# Patient Record
Sex: MALE | Race: WHITE | URBAN - METROPOLITAN AREA
[De-identification: names, ages, dates, MRNs, and addresses within clinical notes are randomized per-mention and may not be internally consistent; named-entity substitution may affect disease eponyms.]

---

## 2022-09-30 ENCOUNTER — OFFICE VISIT (OUTPATIENT)
Dept: URBAN - METROPOLITAN AREA CLINIC 86 | Facility: CLINIC | Age: 62
End: 2022-09-30

## 2022-10-24 ENCOUNTER — LAB OUTSIDE AN ENCOUNTER (OUTPATIENT)
Dept: URBAN - METROPOLITAN AREA CLINIC 86 | Facility: CLINIC | Age: 62
End: 2022-10-24

## 2022-10-24 ENCOUNTER — CLAIMS CREATED FROM THE CLAIM WINDOW (OUTPATIENT)
Dept: URBAN - METROPOLITAN AREA CLINIC 86 | Facility: CLINIC | Age: 62
End: 2022-10-24
Payer: COMMERCIAL

## 2022-10-24 ENCOUNTER — WEB ENCOUNTER (OUTPATIENT)
Dept: URBAN - METROPOLITAN AREA CLINIC 86 | Facility: CLINIC | Age: 62
End: 2022-10-24

## 2022-10-24 VITALS
HEART RATE: 84 BPM | HEIGHT: 70 IN | WEIGHT: 254 LBS | TEMPERATURE: 97.3 F | DIASTOLIC BLOOD PRESSURE: 93 MMHG | BODY MASS INDEX: 36.36 KG/M2 | SYSTOLIC BLOOD PRESSURE: 166 MMHG

## 2022-10-24 DIAGNOSIS — E78.5 HYPERLIPIDEMIA: ICD-10-CM

## 2022-10-24 DIAGNOSIS — Z71.89 VACCINE COUNSELING: ICD-10-CM

## 2022-10-24 DIAGNOSIS — M10.9 GOUT: ICD-10-CM

## 2022-10-24 DIAGNOSIS — E61.1 IRON DEFICIENCY: ICD-10-CM

## 2022-10-24 DIAGNOSIS — Z79.899 HIGH RISK MEDICATION USE: ICD-10-CM

## 2022-10-24 DIAGNOSIS — R74.8 ABNORMAL LIVER ENZYMES: ICD-10-CM

## 2022-10-24 DIAGNOSIS — K75.4 AUTOIMMUNE HEPATITIS: ICD-10-CM

## 2022-10-24 DIAGNOSIS — I10 HTN (HYPERTENSION): ICD-10-CM

## 2022-10-24 DIAGNOSIS — D69.1 THROMBOCYTOPENIA: ICD-10-CM

## 2022-10-24 DIAGNOSIS — E29.1 ANDROGEN DEFICIENCY: ICD-10-CM

## 2022-10-24 DIAGNOSIS — E03.9 HYPOTHYROID: ICD-10-CM

## 2022-10-24 DIAGNOSIS — K51.90 ULCERATIVE COLITIS: ICD-10-CM

## 2022-10-24 DIAGNOSIS — E11.9 DIABETES: ICD-10-CM

## 2022-10-24 DIAGNOSIS — M12.9 ARTHROPATHY: ICD-10-CM

## 2022-10-24 DIAGNOSIS — L40.9 PSORIASIS: ICD-10-CM

## 2022-10-24 DIAGNOSIS — E66.9 OBESITY: ICD-10-CM

## 2022-10-24 DIAGNOSIS — Z86.010 HISTORY OF COLON POLYPS: ICD-10-CM

## 2022-10-24 PROCEDURE — 99245 OFF/OP CONSLTJ NEW/EST HI 55: CPT

## 2022-10-24 PROCEDURE — 99205 OFFICE O/P NEW HI 60 MIN: CPT

## 2022-10-24 RX ORDER — TESTOSTERONE CYPIONATE 200 MG/ML
1 ML INJECTION INTRAMUSCULAR
Status: ACTIVE | COMMUNITY

## 2022-10-24 RX ORDER — AZATHIOPRINE 50 1/1
TAKE 2 TABLET(100 MG) BY MOUTH EVERY DAY TABLET ORAL ONCE A DAY
Status: ACTIVE | COMMUNITY

## 2022-10-24 RX ORDER — LEVOTHYROXINE, LIOTHYRONINE 76; 18 UG/1; UG/1
1 TABLET ON AN EMPTY STOMACH TABLET ORAL ONCE A DAY
Status: ACTIVE | COMMUNITY

## 2022-10-24 RX ORDER — SEMAGLUTIDE 1.34 MG/ML
AS DIRECTED INJECTION, SOLUTION SUBCUTANEOUS
Status: ACTIVE | COMMUNITY

## 2022-10-24 RX ORDER — PREDNISOLONE 5 MG/1
12 TABLETS IN THE MORNING WITH FOOD OR MILK TABLET ORAL ONCE A DAY
Status: ACTIVE | COMMUNITY

## 2022-10-24 RX ORDER — METFORMIN HYDROCHLORIDE 500 MG/1
1 IN AM AND 2 PM TABLET, EXTENDED RELEASE ORAL AS DIRECTED
Status: ACTIVE | COMMUNITY

## 2022-10-24 RX ORDER — ALLOPURINOL 300 MG/1
1 TABLET TABLET ORAL ONCE A DAY
Status: ACTIVE | COMMUNITY

## 2022-10-24 RX ORDER — VARDENAFIL HYDROCHLORIDE 20 MG/1
1 TABLET 60 MINUTES BEFORE SEXUAL ACTIVITY AS NEEDED TABLET, FILM COATED ORAL ONCE A DAY
Status: ACTIVE | COMMUNITY

## 2022-10-24 RX ORDER — VEDOLIZUMAB 300 MG/5ML
AS DIRECTED INJECTION, POWDER, LYOPHILIZED, FOR SOLUTION INTRAVENOUS
Status: ACTIVE | COMMUNITY

## 2022-10-24 RX ORDER — FAMOTIDINE, CALCIUM CARBONATE, AND MAGNESIUM HYDROXIDE 10; 800; 165 MG/1; MG/1; MG/1
1 TABLET AS NEEDED TABLET, CHEWABLE ORAL TWICE A DAY
Status: ACTIVE | COMMUNITY

## 2022-10-24 RX ORDER — LISINOPRIL 20 MG/1
1 TABLET TABLET ORAL ONCE A DAY
Status: ACTIVE | COMMUNITY

## 2022-10-24 NOTE — EXAM-PHYSICAL EXAM
Gen: awake and responsive. Eyes: anicteric, normal lids. Mouth: covered with mask. Nose: covered with mask. Hearing: intact grossly. Neck: trachea midline and no jvd. CV: RRR no s3. Lungs: clear. No wheezes, Abd: Soft, nabs, nr, NT. No liver or spleen enlargement. Ext: no sig edema, some palm erythema. Neuro: moves all 4 ext grossly. No asterixis. Skin: no pruritis and some palm erythema.

## 2022-10-24 NOTE — HPI-TODAY'S VISIT:
Patient is a 62-year-old male being referred in by Dr. Joshua Garcia for history of chronic autoimmune hepatitis.  A copy of the note will be sent to the referring provider.  Approximately 51 pages of a planned 97 page fax were received and reviewed.  Dr Garcia:   October 18, 2022 note states that the patient is being followed for abnormal liver labs.  Dr. Garcia mentions that because of the increased activity of the suspected chronic autoimmune hepatitis that he had called the patient on September 20 2022 and advised him that he needed to proceed to initiate a treatment for it. Pt was out of town.  He mentioned his urine had become a deeper bean coloration during that week.  He denied pruritus.  he want to start him on prednisolone 60 mg a day but he had steroids on him and started prednisone and then switched back.  He was on vacation unfamiliar environment would not be able to  the medicine until October 1, 2022.    He did 3-4 steroids and then prednisolone when back.  Apparently the dose of prednisone he would take will be 50 mg and followed by 45 mg the next day and then 45 mg after that and then start the prednisolone 60mg when he was home.  Follow-up lab 13 days after you have been on the prednisolone demonstrated that the ALT had declined to 70 and AST had declined at 36.  Bilirubin declined at 1.7.  He felt less fatigued on the treatment.  Did have insomnia issues.  They had discussed apparently the benefit of adding other steroid sparing agents.  Clearly stated that he would add azathioprine 50 mg a day for follow-up his labs with the plan to increase to 100 mg a day.  On aza 100mg oct 23.  Stil prednisolone 60mg a day.  He was apparently taking for his ulcerative colitis and Entyvio 300 mg IV. x 3-4 yrs.  Pror remicade reaction.  Allergies are listed as none.  Medical problems include chronic autoimmune hepatitis, ulcerative colitis, GERD, serrated adenoma of the colon, history adenomatous polyp, iron deficiency in the past.  Hypertension, diabetes, androgen deficiency, hyperlipidemia, hypothyroidism, obesity, gout, arthropathy, thrombocytopenia at times, psoriasis.  Psoriasis is doing better with biologic and skin creams.  Social history was moderate alcohol usage about 3 beers per day and says not over day. He says mostly beer.  Stage 2 fibrosis.  Not a smoker. He is . He works in a [employer] manager.  Medicines list prednisolone 60 mg a day, and Entyvio 300 mg every 8 weeks beginning October 21, 2022.  Pepcid Complete he uses as needed, lisinopril 20 mg a day.  Allopurinol 300 mg a day Ozempic 1 mg dose, metformin extended release 500 mg 1 in a.m. and 2 in p.m.  NP thyroid 120 mg a day.  Testosterone cypionate 200 mg/mL as directed, Levitra 20 milligrams as needed, multivitamin daily, acetaminophen as needed. Aza 100mg now.  Family history of heart disease.  Past surgeries include appendectomy, colonoscopy in 2002, 2009, 2017, 2019, 2021, EGD 2009 2015.    Weight listed as being 33.91 BMI with a weight of 250 height is 72 inches with aDr Cherner and the same now.  Nov 2021 to now lost 60 pounds. On ozempic for while and was 318 and prior lot of steroids and then lost ore now. Jan 2021 labs started to go up.  end of 2020 to start 2021: wife started covid cocktail and vit d and vit c and Zinc. He started 1 yr or so ago and took J.FlashSoft shot.  Maybe started that then.  Covid 19 vitamins are immune stimulants and bigger issues with covid 19 echinacea and zinc and elderberry and these meds can trigger aih. He is still on them.  A biopsy from August 23, 2022 shows hepatic parenchyma to have mild to moderate portal inflammation and patchy lobular inflammation/chronic hepatitis.  Periportal fibrosis and focal portal portal fibrosis by trichrome stage II was seen no significant steatosis.  They felt that within the portal triad there was mild to focal moderate predominant chronic inflammatory infiltrate composed of lymphocytes, few plasma cells and few eosinophils.  Patchy lobular chronic inflammation identified but no significant piecemeal necrosis.  Bile ducts were seen in the portal tracts.  No significant fat was seen.  This was read at [facility].  October 20, 2022 labs showed glucose 250 BUN of 18 creatinine 0.98 sodium 133 potassium 4.5 albumin 3.7 bilirubin 1.8 direct bilirubin 0.66 alkaline phosphatase 120 AST 29 ALT 55 with an IgG of 1181.  White blood cell count was 10.6 hemoglobin 17.8 and platelet count was low at 119.  Neutrophils were 9.1 elevated and lymphocytes 1.1.  October 14 labs showed total bilirubin 1.7 alkaline phosphatase 127 AST 36 ALT 70 direct bilirubin 0.68.  White blood cell count 11.3 hemoglobin 17.6 plate count 140 MCV 94.  Neutrophils 9.8 lymphocytes 0.9. September 23 labs showed total protein 8.0 albumin 4.0 bilirubin 3.1 direct 2.06 alk phosphatase 213  .  IgG was 2539.  Glucose 182 BUN is 12 creatinine 0.98 sodium 137 potassium 4.4 White blood cell count 6 hemoglobin 17.2 platelet count 145 neutrophils 3.6 lymphocytes 1.3.  C-reactive protein was normal at 9.  July 29 labs showed white cell count 6.4 hemoglobin 17.4 plate count 153 albumin 4.3 bilirubin 1.  Bili 0.4 alkaline phosphatase 143   INR 1.2 smooth muscle antibody 79 which was positive. July 12, 2022 labs showed bilirubin 1.4 direct bilirubin 0.44 alkaline phosphatase 138   smooth muscle antibody 61 AMA negative at less than 20 and calcium negative at 1.1 IgG elevated 2068 alpha-1 154 normal ferritin 56 ELSI was negative.  June 24, 2022 labs show white blood cell count 5.8 hemoglobin 17.7 plate count 163 glucose 176 BUN of 10 creatinine 0.9 bilirubin 1.4.  Bilirubin 0.64 alkaline phosphatase 157   June 13, 2022 labs showed white blood cell count 5.7 hemoglobin 16.6 platelet count 120 C-reactive protein is 3.  March 14,022 bilirubin 1.3 direct bili 0.54 alkaline phosphatase 151  .  Femoral 25th 2022 labs showed bilirubin 2.1 direct low at 1.07 alk phos 200   hep C antibody negative B surface antigen negative hep A IgM negative B core IgM negative.  Feb 16 2022 labs showed bilirubin 2.1 direct bilirubin 1.33 alk phos 191  .  glucose 159 BUN of 10 creatinine 0.89 sodium 136 potassium 4.7.  White blood cell count 5.2 hemoglobin 17 platelet count 179 ferritin 76 C-reactive protein 6.  December 22, 2021 labs show white cell count 6.8 hemoglobin 16.5 plate count 200 glucose 212 BUN of 11 creatinine 0.59 sodium 134 potassium high at 6.5 albumin 4.0 bilirubin 0.8 alk phos 135  .  August 23, 2021 labs showed bilirubin 0.6 alk phos 97 AST 45 ALT 45 BUN of 12 cr 0.93 glucose 186 while the cell count 5.4 hemoglobin 16.2 platelet 144.  C-reactive protein 2.  May 25th 2021 AST 38 ALT 47 alk phos 110 bilirubin 0.8.  Cholesterol 229 triglycerides 278 HDL 35 .  A1c was 6.8.  March 5, 2021 labs show quant to Farren test was negative.  January 28, 2021 total bilirubin 0.6 alkaline phosphatase 124 AST 31 ALT 43.  A1c was 8.2  October 21, 2020 total bili 0.8 alk phos 111 AST 30 ALT 31.  A1c was 7.6  Elderberry in late summer and so that may have explained the flare that he did.  Plan 1.  We need to follow and update labs to see how doing. 2.   Very suspicious for herbal triggered AIH.  If we remove the herbals, he may be able to come off the meds sooner and if not this will impede himj from clearing and require higer dose meds to combat the effect of this. _update imaging. 3. herbal remedies svetlana zinc and eldeberry can be very potent immunetriggers. 4. No green tea or ashwagandha or moringa. 5. Check IgG and IgM. 5.  Monitor and see how he does with the azathioprine and steroids and suspect off herbals he will be able to wean off faster.  Stressed to pt the need for social distancing and strict handwashing and wearing a mask and to follow any other new or added CDC recommendations as this is an evolving target.   Duration of the visit was 70 min with 35 min of chart prep and 35 minutes for this  face to face visit today with time reviewing their prior and recent records and labs and discussing their current status and future plans for care for the patient.

## 2022-10-29 ENCOUNTER — TELEPHONE ENCOUNTER (OUTPATIENT)
Dept: URBAN - METROPOLITAN AREA CLINIC 92 | Facility: CLINIC | Age: 62
End: 2022-10-29

## 2022-10-29 NOTE — HPI-TODAY'S VISIT:
Dear Akshat Fonseca, October 25 labs given to me. White blood cell count was 8.4 hemoglobin remains elevated at 17.8.  This is just above the cutoff of normal and was seen on some prior labs that you did as well.  Please share with primary provider.  Platelet count was lower at 95 from 119.  Metamyelocytes were elevated at 1 and neutrophils were elevated at 8.0 but down from 9.1 lymphocytes were lower at 0.2 from 1.1. Some of these issues likely steroid related. Sugar was elevated at 251 from previous 250.  BUN of 14 cr 0.94 sodium 132 which is diminished down from 133.  Potassium 4.6 chloride 94 CO2 23 calcium 9.1 and bilirubin remaining slightly up at 1.8.  Alkaline phosphatase down to 117 from 120.  AST of 28 down from 29 and ALT of 49 down from 55.  The IgG is normal at 1087 and was also previously normal at 1181.  That raises to me the question if this is a medicine reaction more so than a true immune reaction.  IgM was 221 slightly elevated with normal being from 20 up to 172.  Lipase was 77. I would still recommend that you remain on the medicines but I think it is very important that you stop these COVID-19 immune stimulants as I suspect that once you do stop them, that we will see dramatic changes in the labs in a much faster weaning off of the immune medicine. Spoke with pt re the labs and reviewed them with him. He says Dr Garcia dropped steroids after he saw labs and we are ok with that. I suspect this is more of a covid 19 immune stimulation med DILI driven event and off the vitamins, he will be able to wean off the meds faster. Dr. Parmar

## 2022-11-03 LAB
A/G RATIO: 1.6
ALBUMIN: 3.9
ALKALINE PHOSPHATASE: 117
ALT (SGPT): 49
AST (SGOT): 28
BANDS: (no result)
BASO (ABSOLUTE): 0
BASOS: 0
BILIRUBIN, TOTAL: 1.8
BLASTS/BLAST LIKE CELLS: (no result)
BUN/CREATININE RATIO: 15
BUN: 14
CALCIUM: 9.1
CARBON DIOXIDE, TOTAL: 23
CHLORIDE: 94
CREATININE: 0.94
EGFR: 91
EOS (ABSOLUTE): 0
EOS: 0
GLOBULIN, TOTAL: 2.5
GLUCOSE: 251
HEMATOCRIT: 52.1
HEMATOLOGY COMMENTS:: (no result)
HEMOGLOBIN: 17.8
IMMATURE CELLS: (no result)
IMMATURE GRANS (ABS): (no result)
IMMATURE GRANULOCYTES: (no result)
IMMUNOGLOBULIN A, QN, SERUM: 361
IMMUNOGLOBULIN E, TOTAL: 14
IMMUNOGLOBULIN G, QN, SERUM: 1087
IMMUNOGLOBULIN M, QN, SERUM: 221
LIPASE: 77
LYMPHS (ABSOLUTE): 0.2
LYMPHS: 2
MCH: 31.6
MCHC: 34.2
MCV: 93
MEGAKARYOCYTES: (no result)
METAMYELOCYTES: 1
MONOCYTES(ABSOLUTE): 0.2
MONOCYTES: 2
MYELOCYTES: (no result)
NEUTROPHILS (ABSOLUTE): 8
NEUTROPHILS: 95
NRBC: (no result)
OTHER, LINEAGE UNCERTAIN: (no result)
PLATELETS: 95
POTASSIUM: 4.6
PROMYELOCYTES: (no result)
PROTEIN, TOTAL: 6.4
RBC: 5.63
RDW: 14.2
SODIUM: 132
WBC: 8.4

## 2022-11-21 ENCOUNTER — LAB OUTSIDE AN ENCOUNTER (OUTPATIENT)
Dept: URBAN - METROPOLITAN AREA CLINIC 86 | Facility: CLINIC | Age: 62
End: 2022-11-21

## 2022-11-23 ENCOUNTER — OFFICE VISIT (OUTPATIENT)
Dept: URBAN - METROPOLITAN AREA CLINIC 86 | Facility: CLINIC | Age: 62
End: 2022-11-23
Payer: COMMERCIAL

## 2022-11-23 ENCOUNTER — TELEPHONE ENCOUNTER (OUTPATIENT)
Dept: URBAN - METROPOLITAN AREA CLINIC 86 | Facility: CLINIC | Age: 62
End: 2022-11-23

## 2022-11-23 VITALS
HEART RATE: 85 BPM | WEIGHT: 249.5 LBS | TEMPERATURE: 97.1 F | DIASTOLIC BLOOD PRESSURE: 95 MMHG | HEIGHT: 70 IN | SYSTOLIC BLOOD PRESSURE: 163 MMHG | BODY MASS INDEX: 35.72 KG/M2

## 2022-11-23 DIAGNOSIS — Z79.899 HIGH RISK MEDICATION USE: ICD-10-CM

## 2022-11-23 DIAGNOSIS — Z86.010 HISTORY OF COLON POLYPS: ICD-10-CM

## 2022-11-23 DIAGNOSIS — M10.9 GOUT: ICD-10-CM

## 2022-11-23 DIAGNOSIS — K51.90 ULCERATIVE COLITIS: ICD-10-CM

## 2022-11-23 DIAGNOSIS — E03.9 HYPOTHYROID: ICD-10-CM

## 2022-11-23 DIAGNOSIS — E61.1 IRON DEFICIENCY: ICD-10-CM

## 2022-11-23 DIAGNOSIS — D69.1 THROMBOCYTOPENIA: ICD-10-CM

## 2022-11-23 DIAGNOSIS — L40.9 PSORIASIS: ICD-10-CM

## 2022-11-23 DIAGNOSIS — K75.4 AUTOIMMUNE HEPATITIS: ICD-10-CM

## 2022-11-23 DIAGNOSIS — R74.8 ABNORMAL LIVER ENZYMES: ICD-10-CM

## 2022-11-23 DIAGNOSIS — Z71.89 VACCINE COUNSELING: ICD-10-CM

## 2022-11-23 DIAGNOSIS — E78.5 HYPERLIPIDEMIA: ICD-10-CM

## 2022-11-23 DIAGNOSIS — E66.9 OBESITY: ICD-10-CM

## 2022-11-23 PROBLEM — 38341003 HYPERTENSION: Status: ACTIVE | Noted: 2022-10-24

## 2022-11-23 PROBLEM — 38825009 ANDROGEN DEFICIENCY: Status: ACTIVE | Noted: 2022-10-24

## 2022-11-23 PROBLEM — 111552007 DIABETES MELLITUS WITHOUT COMPLICATION: Status: ACTIVE | Noted: 2022-10-24

## 2022-11-23 PROBLEM — 399269003 ARTHROPATHY: Status: ACTIVE | Noted: 2022-10-24

## 2022-11-23 PROCEDURE — 99214 OFFICE O/P EST MOD 30 MIN: CPT | Performed by: PHYSICIAN ASSISTANT

## 2022-11-23 PROCEDURE — 99214 OFFICE O/P EST MOD 30 MIN: CPT

## 2022-11-23 RX ORDER — TESTOSTERONE CYPIONATE 200 MG/ML
1 ML INJECTION INTRAMUSCULAR
Status: ACTIVE | COMMUNITY

## 2022-11-23 RX ORDER — ALLOPURINOL 300 MG/1
1 TABLET TABLET ORAL ONCE A DAY
Status: ACTIVE | COMMUNITY

## 2022-11-23 RX ORDER — PREDNISOLONE 5 MG/1
12 TABLETS IN THE MORNING WITH FOOD OR MILK TABLET ORAL ONCE A DAY
Status: ACTIVE | COMMUNITY

## 2022-11-23 RX ORDER — AZATHIOPRINE 50 1/1
TAKE 2 TABLET(100 MG) BY MOUTH EVERY DAY TABLET ORAL ONCE A DAY
Status: ACTIVE | COMMUNITY

## 2022-11-23 RX ORDER — FAMOTIDINE, CALCIUM CARBONATE, AND MAGNESIUM HYDROXIDE 10; 800; 165 MG/1; MG/1; MG/1
1 TABLET AS NEEDED TABLET, CHEWABLE ORAL TWICE A DAY
Status: ACTIVE | COMMUNITY

## 2022-11-23 RX ORDER — LISINOPRIL 20 MG/1
1 TABLET TABLET ORAL ONCE A DAY
Status: ACTIVE | COMMUNITY

## 2022-11-23 RX ORDER — VEDOLIZUMAB 300 MG/5ML
AS DIRECTED INJECTION, POWDER, LYOPHILIZED, FOR SOLUTION INTRAVENOUS
Status: ACTIVE | COMMUNITY

## 2022-11-23 RX ORDER — VARDENAFIL HYDROCHLORIDE 20 MG/1
1 TABLET 60 MINUTES BEFORE SEXUAL ACTIVITY AS NEEDED TABLET, FILM COATED ORAL ONCE A DAY
Status: ACTIVE | COMMUNITY

## 2022-11-23 RX ORDER — METFORMIN HYDROCHLORIDE 500 MG/1
1 IN AM AND 2 PM TABLET, EXTENDED RELEASE ORAL AS DIRECTED
Status: ACTIVE | COMMUNITY

## 2022-11-23 RX ORDER — SEMAGLUTIDE 1.34 MG/ML
AS DIRECTED INJECTION, SOLUTION SUBCUTANEOUS
Status: ACTIVE | COMMUNITY

## 2022-11-23 RX ORDER — LEVOTHYROXINE, LIOTHYRONINE 76; 18 UG/1; UG/1
1 TABLET ON AN EMPTY STOMACH TABLET ORAL ONCE A DAY
Status: ACTIVE | COMMUNITY

## 2022-11-23 NOTE — HPI-TODAY'S VISIT:
62-year-old male being referred in by Dr. Joshua Garcia for evaluation of possible autoimmune hepatitis   A copy of the note will be sent to the referring provider.  11/23/22 visit October 25 labs given to me. White blood cell count was 8.4 hemoglobin remains elevated at 17.8.  This is just above the cutoff of normal and was seen on some prior labs that you did as well.  Please share with primary provider.  Platelet count was lower at 95 from 119.  lymphocytes were elevated at 1 and neutrophils were elevated at 8.0 but down from 9.1 lymphocytes were lower at 0.2 from 1.1. Some of these issues likely steroid related. Sugar was elevated at 251 from previous 250.  BUN of 14 cr 0.94 sodium 132 which is diminished down from 133.  Potassium 4.6 chloride 94 CO2 23 calcium 9.1 and bilirubin remaining slightly up at 1.8.  Alkaline phosphatase down to 117 from 120.  AST of 28 down from 29 and ALT of 49 down from 55.  The IgG is normal at 1087 and was also previously normal at 1181.  That raises to me the question if this is a medicine reaction more so than a true immune reaction.  IgM was 221 slightly elevated with normal being from 20 up to 172.  Lipase was 77. I would still recommend that you remain on the medicines but I think it is very important that you stop these COVID-19 immune stimulants as I suspect that once you do stop them, that we will see dramatic changes in the labs in a much faster weaning off of the immune medicine. Spoke with pt re the labs and reviewed them with him. He says Dr Garcia dropped steroids after he saw labs and we are ok with that. I suspect this is more of a covid 19 immune stimulation med DILI driven event and off the vitamins, he will be able to wean off the meds faster.   MRI done at [facility] forthsyth MRI done and tehre was splenomegaly but it was smaller than a 2015 study they did see varices on the mri patient noted and Dr. Garcia is planning for EGD  Patient is taking 3 15 mg prednisone along witih the azothiaprine 250 mg  He has been off of the covid 19 immune stimulants since the last time we saw him and this is good and supports our theory here and labs are continuing to improve per pt but I do not have the labs from 11/15, will obtian as long as continuing to improve ok for him to ocontinue to taper off the steriods with Dr. Garcia. Notes he was on the stimulants since end of 2021 so along time and glad he has stopped them and its helping clearly   RECAP Approximately 51 pages of a planned 97 page fax were received and reviewed.  Dr Garcia:   October 18, 2022 note states that the patient is being followed for abnormal liver labs.  Dr. Garcia mentions that because of the increased activity of the suspected chronic autoimmune hepatitis that he had called the patient on September 20 2022 and advised him that he needed to proceed to initiate a treatment for it. Pt was out of town.  He mentioned his urine had become a deeper bean coloration during that week.  He denied pruritus.  he want to start him on prednisolone 60 mg a day but he had steroids on him and started prednisone and then switched back.  He was on vacation unfamiliar environment would not be able to  the medicine until October 1, 2022.    He did 3-4 steroids and then prednisolone when back.  Apparently the dose of prednisone he would take will be 50 mg and followed by 45 mg the next day and then 45 mg after that and then start the prednisolone 60mg when he was home.  Follow-up lab 13 days after you have been on the prednisolone demonstrated that the ALT had declined to 70 and AST had declined at 36.  Bilirubin declined at 1.7.  He felt less fatigued on the treatment.  Did have insomnia issues.  They had discussed apparently the benefit of adding other steroid sparing agents.  Clearly stated that he would add azathioprine 50 mg a day for follow-up his labs with the plan to increase to 100 mg a day.  On aza 100mg oct 23.  Stil prednisolone 60mg a day.  He was apparently taking for his ulcerative colitis and Entyvio 300 mg IV. x 3-4 yrs.  Pror remicade reaction.  Allergies are listed as none.  Medical problems include chronic autoimmune hepatitis, ulcerative colitis, GERD, serrated adenoma of the colon, history adenomatous polyp, iron deficiency in the past.  Hypertension, diabetes, androgen deficiency, hyperlipidemia, hypothyroidism, obesity, gout, arthropathy, thrombocytopenia at times, psoriasis.  Psoriasis is doing better with biologic and skin creams.  Social history was moderate alcohol usage about 3 beers per day and says not over day. He says mostly beer.  Stage 2 fibrosis.  Not a smoker. He is . He works in a [employer] manager.  Medicines list prednisolone 60 mg a day, and Entyvio 300 mg every 8 weeks beginning October 21, 2022.  Pepcid Complete he uses as needed, lisinopril 20 mg a day.  Allopurinol 300 mg a day Ozempic 1 mg dose, metformin extended release 500 mg 1 in a.m. and 2 in p.m.  NP thyroid 120 mg a day.  Testosterone cypionate 200 mg/mL as directed, Levitra 20 milligrams as needed, multivitamin daily, acetaminophen as needed. Aza 100mg now.  Family history of heart disease.  Past surgeries include appendectomy, colonoscopy in 2002, 2009, 2017, 2019, 2021, EGD 2009 2015.    Weight listed as being 33.91 BMI with a weight of 250 height is 72 inches with aDr Radha and the same now.  Nov 2021 to now lost 60 pounds. On ozempic for while and was 318 and prior lot of steroids and then lost ore now. Jan 2021 labs started to go up.  end of 2020 to start 2021: wife started covid cocktail and vit d and vit c and Zinc. He started 1 yr or so ago and took J.J shot.  Maybe started that then.  Covid 19 vitamins are immune stimulants and bigger issues with covid 19 echinacea and zinc and elderberry and these meds can trigger aih. He is still on them.  A biopsy from August 23, 2022 shows hepatic parenchyma to have mild to moderate portal inflammation and patchy lobular inflammation/chronic hepatitis.  Periportal fibrosis and focal portal portal fibrosis by trichrome stage II was seen no significant steatosis.  They felt that within the portal triad there was mild to focal moderate predominant chronic inflammatory infiltrate composed of lymphocytes, few plasma cells and few eosinophils.  Patchy lobular chronic inflammation identified but no significant piecemeal necrosis.  Bile ducts were seen in the portal tracts.  No significant fat was seen.  This was read at [facility].  October 20, 2022 labs showed glucose 250 BUN of 18 creatinine 0.98 sodium 133 potassium 4.5 albumin 3.7 bilirubin 1.8 direct bilirubin 0.66 alkaline phosphatase 120 AST 29 ALT 55 with an IgG of 1181.  White blood cell count was 10.6 hemoglobin 17.8 and platelet count was low at 119.  Neutrophils were 9.1 elevated and lymphocytes 1.1.  October 14 labs showed total bilirubin 1.7 alkaline phosphatase 127 AST 36 ALT 70 direct bilirubin 0.68.  White blood cell count 11.3 hemoglobin 17.6 plate count 140 MCV 94.  Neutrophils 9.8 lymphocytes 0.9. September 23 labs showed total protein 8.0 albumin 4.0 bilirubin 3.1 direct 2.06 alk phosphatase 213  .  IgG was 2539.  Glucose 182 BUN is 12 creatinine 0.98 sodium 137 potassium 4.4 White blood cell count 6 hemoglobin 17.2 platelet count 145 neutrophils 3.6 lymphocytes 1.3.  C-reactive protein was normal at 9.  July 29 labs showed white cell count 6.4 hemoglobin 17.4 plate count 153 albumin 4.3 bilirubin 1.  Bili 0.4 alkaline phosphatase 143   INR 1.2 smooth muscle antibody 79 which was positive. July 12, 2022 labs showed bilirubin 1.4 direct bilirubin 0.44 alkaline phosphatase 138   smooth muscle antibody 61 AMA negative at less than 20 and calcium negative at 1.1 IgG elevated 2068 alpha-1 154 normal ferritin 56 ELSI was negative.  June 24, 2022 labs show white blood cell count 5.8 hemoglobin 17.7 plate count 163 glucose 176 BUN of 10 creatinine 0.9 bilirubin 1.4.  Bilirubin 0.64 alkaline phosphatase 157   June 13, 2022 labs showed white blood cell count 5.7 hemoglobin 16.6 platelet count 120 C-reactive protein is 3.  March 14,022 bilirubin 1.3 direct bili 0.54 alkaline phosphatase 151  .  Femoral 25th 2022 labs showed bilirubin 2.1 direct low at 1.07 alk phos 200   hep C antibody negative B surface antigen negative hep A IgM negative B core IgM negative.  Feb 16 2022 labs showed bilirubin 2.1 direct bilirubin 1.33 alk phos 191  .  glucose 159 BUN of 10 creatinine 0.89 sodium 136 potassium 4.7.  White blood cell count 5.2 hemoglobin 17 platelet count 179 ferritin 76 C-reactive protein 6.  December 22, 2021 labs show white cell count 6.8 hemoglobin 16.5 plate count 200 glucose 212 BUN of 11 creatinine 0.59 sodium 134 potassium high at 6.5 albumin 4.0 bilirubin 0.8 alk phos 135  .  August 23, 2021 labs showed bilirubin 0.6 alk phos 97 AST 45 ALT 45 BUN of 12 cr 0.93 glucose 186 while the cell count 5.4 hemoglobin 16.2 platelet 144.  C-reactive protein 2.  May 25th 2021 AST 38 ALT 47 alk phos 110 bilirubin 0.8.  Cholesterol 229 triglycerides 278 HDL 35 .  A1c was 6.8.  March 5, 2021 labs show quant to Farren test was negative.  January 28, 2021 total bilirubin 0.6 alkaline phosphatase 124 AST 31 ALT 43.  A1c was 8.2  October 21, 2020 total bili 0.8 alk phos 111 AST 30 ALT 31.  A1c was 7.6  Elderberry in late summer and so that may have explained the flare that he did.

## 2022-11-30 LAB
A/G RATIO: 1.9
ALBUMIN: 3.9
ALKALINE PHOSPHATASE: 200
ALT (SGPT): 25
AST (SGOT): 19
BASO (ABSOLUTE): 0
BASOS: 1
BILIRUBIN, TOTAL: 1.7
BUN/CREATININE RATIO: 10
BUN: 10
CALCIUM: 8.9
CARBON DIOXIDE, TOTAL: 23
CHLORIDE: 98
CREATININE: 1.01
EGFR: 83
EOS (ABSOLUTE): 0
EOS: 0
GLOBULIN, TOTAL: 2.1
GLUCOSE: 402
HEMATOCRIT: 50
HEMATOLOGY COMMENTS:: (no result)
HEMOGLOBIN: 16.8
IMMATURE CELLS: (no result)
IMMATURE GRANS (ABS): 0.2
IMMATURE GRANULOCYTES: 2
IMMUNOGLOBULIN G, QN, SERUM: 847
IMMUNOGLOBULIN M, QN, SERUM: 184
LYMPHS (ABSOLUTE): 0.4
LYMPHS: 5
MCH: 32.5
MCHC: 33.6
MCV: 97
MONOCYTES(ABSOLUTE): 0.2
MONOCYTES: 3
NEUTROPHILS (ABSOLUTE): 6.2
NEUTROPHILS: 89
NRBC: (no result)
PLATELETS: 170
POTASSIUM: 4.6
PROTEIN, TOTAL: 6
RBC: 5.17
RDW: 16
SODIUM: 136
WBC: 6.9

## 2022-12-07 ENCOUNTER — LAB OUTSIDE AN ENCOUNTER (OUTPATIENT)
Dept: URBAN - METROPOLITAN AREA CLINIC 86 | Facility: CLINIC | Age: 62
End: 2022-12-07

## 2022-12-13 LAB
A/G RATIO: 1.7
ALBUMIN: 4
ALKALINE PHOSPHATASE: 100
ALT (SGPT): 25
AST (SGOT): 21
BASO (ABSOLUTE): 0.1
BASOS: 1
BILIRUBIN, TOTAL: 1.2
BUN/CREATININE RATIO: 14
BUN: 13
CALCIUM: 9.4
CARBON DIOXIDE, TOTAL: 23
CHLORIDE: 97
CREATININE: 0.95
EGFR: 89
EOS (ABSOLUTE): 0
EOS: 0
GLOBULIN, TOTAL: 2.3
GLUCOSE: 312
HEMATOCRIT: 47.6
HEMATOLOGY COMMENTS:: (no result)
HEMOGLOBIN: 16.2
IMMATURE CELLS: (no result)
IMMATURE GRANS (ABS): 0.2
IMMATURE GRANULOCYTES: 2
IMMUNOGLOBULIN G, QN, SERUM: 806
IMMUNOGLOBULIN M, QN, SERUM: 205
LYMPHS (ABSOLUTE): 0.7
LYMPHS: 9
MCH: 32.9
MCHC: 34
MCV: 97
MONOCYTES(ABSOLUTE): 0.2
MONOCYTES: 3
NEUTROPHILS (ABSOLUTE): 6.4
NEUTROPHILS: 85
NRBC: (no result)
PLATELETS: 126
POTASSIUM: 5
PROTEIN, TOTAL: 6.3
RBC: 4.92
RDW: 16.6
SODIUM: 135
WBC: 7.5

## 2022-12-21 ENCOUNTER — LAB OUTSIDE AN ENCOUNTER (OUTPATIENT)
Dept: URBAN - METROPOLITAN AREA CLINIC 86 | Facility: CLINIC | Age: 62
End: 2022-12-21

## 2022-12-30 LAB
BANDS: (no result)
BASO (ABSOLUTE): 0.1
BASOS: 1
BLASTS/BLAST LIKE CELLS: (no result)
EOS (ABSOLUTE): 0
EOS: 0
HEMATOCRIT: 44.8
HEMATOLOGY COMMENTS:: (no result)
HEMOGLOBIN: 15.6
IMMATURE CELLS: (no result)
IMMATURE GRANS (ABS): (no result)
IMMATURE GRANULOCYTES: (no result)
IMMUNOGLOBULIN G, QN, SERUM: 764
IMMUNOGLOBULIN M, QN, SERUM: 155
LYMPHS (ABSOLUTE): 0.4
LYMPHS: 6
MCH: 34.5
MCHC: 34.8
MCV: 99
MEGAKARYOCYTES: (no result)
METAMYELOCYTES: (no result)
MONOCYTES(ABSOLUTE): 0.2
MONOCYTES: 3
MYELOCYTES: 1
NEUTROPHILS (ABSOLUTE): 6.2
NEUTROPHILS: 89
NRBC: (no result)
OTHER, LINEAGE UNCERTAIN: (no result)
PLATELETS: 98
PROMYELOCYTES: (no result)
RBC: 4.52
RDW: 18.1
WBC: 7

## 2023-01-04 ENCOUNTER — DASHBOARD ENCOUNTERS (OUTPATIENT)
Age: 63
End: 2023-01-04

## 2023-01-04 ENCOUNTER — OFFICE VISIT (OUTPATIENT)
Dept: URBAN - METROPOLITAN AREA TELEHEALTH 2 | Facility: TELEHEALTH | Age: 63
End: 2023-01-04
Payer: COMMERCIAL

## 2023-01-04 DIAGNOSIS — K51.80 CHRONIC PANCOLONIC ULCERATIVE COLITIS: ICD-10-CM

## 2023-01-04 DIAGNOSIS — K75.4 AUTOIMMUNE HEPATITIS: ICD-10-CM

## 2023-01-04 DIAGNOSIS — R74.8 ABNORMAL LIVER ENZYMES: ICD-10-CM

## 2023-01-04 DIAGNOSIS — Z79.899 HIGH RISK MEDICATION USE: ICD-10-CM

## 2023-01-04 PROBLEM — 408335007 AUTOIMMUNE HEPATITIS: Status: ACTIVE | Noted: 2022-10-24

## 2023-01-04 PROBLEM — 9014002 PSORIASIS: Status: ACTIVE | Noted: 2022-10-24

## 2023-01-04 PROBLEM — 55822004 HYPERLIPIDEMIA: Status: ACTIVE | Noted: 2022-10-24

## 2023-01-04 PROBLEM — 428283002 HISTORY OF POLYP OF COLON: Status: ACTIVE | Noted: 2022-10-24

## 2023-01-04 PROBLEM — 409063005 COUNSELING: Status: ACTIVE | Noted: 2022-10-24

## 2023-01-04 PROBLEM — 64766004 ULCERATIVE COLITIS: Status: ACTIVE | Noted: 2022-10-24

## 2023-01-04 PROBLEM — 40930008 HYPOTHYROID: Status: ACTIVE | Noted: 2022-10-24

## 2023-01-04 PROBLEM — 90560007 GOUT: Status: ACTIVE | Noted: 2022-10-24

## 2023-01-04 PROBLEM — 35240004 IRON DEFICIENCY: Status: ACTIVE | Noted: 2022-10-24

## 2023-01-04 PROBLEM — 414916001 OBESITY: Status: ACTIVE | Noted: 2022-10-24

## 2023-01-04 PROBLEM — 302215000 THROMBOCYTOPENIA: Status: ACTIVE | Noted: 2022-10-24

## 2023-01-04 PROBLEM — 166643006 LIVER ENZYMES ABNORMAL: Status: ACTIVE | Noted: 2022-10-24

## 2023-01-04 PROBLEM — 161646004 H/O: HIGH RISK MEDICATION: Status: ACTIVE | Noted: 2022-10-24

## 2023-01-04 PROCEDURE — 99214 OFFICE O/P EST MOD 30 MIN: CPT

## 2023-01-04 RX ORDER — AZATHIOPRINE 50 1/1
TAKE 2 TABLET(100 MG) BY MOUTH EVERY DAY TABLET ORAL ONCE A DAY
Status: ACTIVE | COMMUNITY

## 2023-01-04 RX ORDER — ALLOPURINOL 300 MG/1
1 TABLET TABLET ORAL ONCE A DAY
Status: ACTIVE | COMMUNITY

## 2023-01-04 RX ORDER — METFORMIN HYDROCHLORIDE 500 MG/1
1 IN AM AND 2 PM TABLET, EXTENDED RELEASE ORAL AS DIRECTED
Status: ACTIVE | COMMUNITY

## 2023-01-04 RX ORDER — LEVOTHYROXINE, LIOTHYRONINE 76; 18 UG/1; UG/1
1 TABLET ON AN EMPTY STOMACH TABLET ORAL ONCE A DAY
Status: ACTIVE | COMMUNITY

## 2023-01-04 RX ORDER — VEDOLIZUMAB 300 MG/5ML
AS DIRECTED INJECTION, POWDER, LYOPHILIZED, FOR SOLUTION INTRAVENOUS
Status: ACTIVE | COMMUNITY

## 2023-01-04 RX ORDER — TESTOSTERONE CYPIONATE 200 MG/ML
1 ML INJECTION INTRAMUSCULAR
Status: ACTIVE | COMMUNITY

## 2023-01-04 RX ORDER — SEMAGLUTIDE 1.34 MG/ML
AS DIRECTED INJECTION, SOLUTION SUBCUTANEOUS
Status: ACTIVE | COMMUNITY

## 2023-01-04 RX ORDER — PREDNISOLONE 5 MG/1
12 TABLETS IN THE MORNING WITH FOOD OR MILK TABLET ORAL ONCE A DAY
Status: ACTIVE | COMMUNITY

## 2023-01-04 RX ORDER — FAMOTIDINE, CALCIUM CARBONATE, AND MAGNESIUM HYDROXIDE 10; 800; 165 MG/1; MG/1; MG/1
1 TABLET AS NEEDED TABLET, CHEWABLE ORAL TWICE A DAY
Status: ACTIVE | COMMUNITY

## 2023-01-04 RX ORDER — VARDENAFIL HYDROCHLORIDE 20 MG/1
1 TABLET 60 MINUTES BEFORE SEXUAL ACTIVITY AS NEEDED TABLET, FILM COATED ORAL ONCE A DAY
Status: ACTIVE | COMMUNITY

## 2023-01-04 RX ORDER — LISINOPRIL 20 MG/1
1 TABLET TABLET ORAL ONCE A DAY
Status: ACTIVE | COMMUNITY

## 2023-01-04 NOTE — HPI-TODAY'S VISIT:
62-year-old male being referred in by Dr. Joshua Garcia for evaluation of possible autoimmune hepatitis   A copy of the note will be sent to the referring provider.  1/4/22 telemed labs continue to improve in regards to igg, igm the 12/21/22 labs i do not ahve a cmp and he did labs las tweek and they are pending Currently 30mg daily of prednisone and azo 50 mg  endoscopy done and no varices seen but there was yeast and on treatment for this on diflucan daily x 1 months so still on curious to see recent labs to see if there is effect. discussed hesistant to taper while on diflucan and need to monitor. will do this and he agrees dnies other medications reviewed othe rlabs and recomend pcp check the b12 given mcv up  11/23/22 visit October 25 labs given to me. White blood cell count was 8.4 hemoglobin remains elevated at 17.8.  This is just above the cutoff of normal and was seen on some prior labs that you did as well.  Please share with primary provider.  Platelet count was lower at 95 from 119.  lymphocytes were elevated at 1 and neutrophils were elevated at 8.0 but down from 9.1 lymphocytes were lower at 0.2 from 1.1. Some of these issues likely steroid related. Sugar was elevated at 251 from previous 250.  BUN of 14 cr 0.94 sodium 132 which is diminished down from 133.  Potassium 4.6 chloride 94 CO2 23 calcium 9.1 and bilirubin remaining slightly up at 1.8.  Alkaline phosphatase down to 117 from 120.  AST of 28 down from 29 and ALT of 49 down from 55.  The IgG is normal at 1087 and was also previously normal at 1181.  That raises to me the question if this is a medicine reaction more so than a true immune reaction.  IgM was 221 slightly elevated with normal being from 20 up to 172.  Lipase was 77. I would still recommend that you remain on the medicines but I think it is very important that you stop these COVID-19 immune stimulants as I suspect that once you do stop them, that we will see dramatic changes in the labs in a much faster weaning off of the immune medicine. Spoke with pt re the labs and reviewed them with him. He says Dr Garcia dropped steroids after he saw labs and we are ok with that. I suspect this is more of a covid 19 immune stimulation med DILI driven event and off the vitamins, he will be able to wean off the meds faster.   MRI done at [facility] forthsyth MRI done and tehre was splenomegaly but it was smaller than a 2015 study they did see varices on the mri patient noted and Dr. Garcia is planning for EGD  Patient is taking 3 15 mg prednisone along witih the azothiaprine 250 mg  He has been off of the covid 19 immune stimulants since the last time we saw him and this is good and supports our theory here and labs are continuing to improve per pt but I do not have the labs from 11/15, will obtian as long as continuing to improve ok for him to ocontinue to taper off the steriods with Dr. Garcia. Notes he was on the stimulants since end of 2021 so along time and glad he has stopped them and its helping clearly   RECAP Approximately 51 pages of a planned 97 page fax were received and reviewed.  Dr Garcia:   October 18, 2022 note states that the patient is being followed for abnormal liver labs.  Dr. Garcia mentions that because of the increased activity of the suspected chronic autoimmune hepatitis that he had called the patient on September 20 2022 and advised him that he needed to proceed to initiate a treatment for it. Pt was out of town.  He mentioned his urine had become a deeper bean coloration during that week.  He denied pruritus.  he want to start him on prednisolone 60 mg a day but he had steroids on him and started prednisone and then switched back.  He was on vacation unfamiliar environment would not be able to  the medicine until October 1, 2022.    He did 3-4 steroids and then prednisolone when back.  Apparently the dose of prednisone he would take will be 50 mg and followed by 45 mg the next day and then 45 mg after that and then start the prednisolone 60mg when he was home.  Follow-up lab 13 days after you have been on the prednisolone demonstrated that the ALT had declined to 70 and AST had declined at 36.  Bilirubin declined at 1.7.  He felt less fatigued on the treatment.  Did have insomnia issues.  They had discussed apparently the benefit of adding other steroid sparing agents.  Clearly stated that he would add azathioprine 50 mg a day for follow-up his labs with the plan to increase to 100 mg a day.  On aza 100mg oct 23.  Stil prednisolone 60mg a day.  He was apparently taking for his ulcerative colitis and Entyvio 300 mg IV. x 3-4 yrs.  Pror remicade reaction.  Allergies are listed as none.  Medical problems include chronic autoimmune hepatitis, ulcerative colitis, GERD, serrated adenoma of the colon, history adenomatous polyp, iron deficiency in the past.  Hypertension, diabetes, androgen deficiency, hyperlipidemia, hypothyroidism, obesity, gout, arthropathy, thrombocytopenia at times, psoriasis.  Psoriasis is doing better with biologic and skin creams.  Social history was moderate alcohol usage about 3 beers per day and says not over day. He says mostly beer.  Stage 2 fibrosis.  Not a smoker. He is . He works in a [employer].  Medicines list prednisolone 60 mg a day, and Entyvio 300 mg every 8 weeks beginning October 21, 2022.  Pepcid Complete he uses as needed, lisinopril 20 mg a day.  Allopurinol 300 mg a day Ozempic 1 mg dose, metformin extended release 500 mg 1 in a.m. and 2 in p.m.  NP thyroid 120 mg a day.  Testosterone cypionate 200 mg/mL as directed, Levitra 20 milligrams as needed, multivitamin daily, acetaminophen as needed. Aza 100mg now.  Family history of heart disease.  Past surgeries include appendectomy, colonoscopy in 2002, 2009, 2017, 2019, 2021, EGD 2009 2015.    Weight listed as being 33.91 BMI with a weight of 250 height is 72 inches with Celine Garcia and the same now.  Nov 2021 to now lost 60 pounds. On ozempic for while and was 318 and prior lot of steroids and then lost ore now. Jan 2021 labs started to go up.  end of 2020 to start 2021: wife started covid cocktail and vit d and vit c and Zinc. He started 1 yr or so ago and took J.J shot.  Maybe started that then.  Covid 19 vitamins are immune stimulants and bigger issues with covid 19 echinacea and zinc and elderberry and these meds can trigger aih. He is still on them.  A biopsy from August 23, 2022 shows hepatic parenchyma to have mild to moderate portal inflammation and patchy lobular inflammation/chronic hepatitis.  Periportal fibrosis and focal portal portal fibrosis by trichrome stage II was seen no significant steatosis.  They felt that within the portal triad there was mild to focal moderate predominant chronic inflammatory infiltrate composed of lymphocytes, few plasma cells and few eosinophils.  Patchy lobular chronic inflammation identified but no significant piecemeal necrosis.  Bile ducts were seen in the portal tracts.  No significant fat was seen.  This was read at [facility].  October 20, 2022 labs showed glucose 250 BUN of 18 creatinine 0.98 sodium 133 potassium 4.5 albumin 3.7 bilirubin 1.8 direct bilirubin 0.66 alkaline phosphatase 120 AST 29 ALT 55 with an IgG of 1181.  White blood cell count was 10.6 hemoglobin 17.8 and platelet count was low at 119.  Neutrophils were 9.1 elevated and lymphocytes 1.1.  October 14 labs showed total bilirubin 1.7 alkaline phosphatase 127 AST 36 ALT 70 direct bilirubin 0.68.  White blood cell count 11.3 hemoglobin 17.6 plate count 140 MCV 94.  Neutrophils 9.8 lymphocytes 0.9. September 23 labs showed total protein 8.0 albumin 4.0 bilirubin 3.1 direct 2.06 alk phosphatase 213  .  IgG was 2539.  Glucose 182 BUN is 12 creatinine 0.98 sodium 137 potassium 4.4 White blood cell count 6 hemoglobin 17.2 platelet count 145 neutrophils 3.6 lymphocytes 1.3.  C-reactive protein was normal at 9.  July 29 labs showed white cell count 6.4 hemoglobin 17.4 plate count 153 albumin 4.3 bilirubin 1.  Bili 0.4 alkaline phosphatase 143   INR 1.2 smooth muscle antibody 79 which was positive. July 12, 2022 labs showed bilirubin 1.4 direct bilirubin 0.44 alkaline phosphatase 138   smooth muscle antibody 61 AMA negative at less than 20 and calcium negative at 1.1 IgG elevated 2068 alpha-1 154 normal ferritin 56 ELSI was negative.  June 24, 2022 labs show white blood cell count 5.8 hemoglobin 17.7 plate count 163 glucose 176 BUN of 10 creatinine 0.9 bilirubin 1.4.  Bilirubin 0.64 alkaline phosphatase 157   June 13, 2022 labs showed white blood cell count 5.7 hemoglobin 16.6 platelet count 120 C-reactive protein is 3.  March 14,022 bilirubin 1.3 direct bili 0.54 alkaline phosphatase 151  .  Femoral 25th 2022 labs showed bilirubin 2.1 direct low at 1.07 alk phos 200   hep C antibody negative B surface antigen negative hep A IgM negative B core IgM negative.  Feb 16 2022 labs showed bilirubin 2.1 direct bilirubin 1.33 alk phos 191  .  glucose 159 BUN of 10 creatinine 0.89 sodium 136 potassium 4.7.  White blood cell count 5.2 hemoglobin 17 platelet count 179 ferritin 76 C-reactive protein 6.  December 22, 2021 labs show white cell count 6.8 hemoglobin 16.5 plate count 200 glucose 212 BUN of 11 creatinine 0.59 sodium 134 potassium high at 6.5 albumin 4.0 bilirubin 0.8 alk phos 135  .  August 23, 2021 labs showed bilirubin 0.6 alk phos 97 AST 45 ALT 45 BUN of 12 cr 0.93 glucose 186 while the cell count 5.4 hemoglobin 16.2 platelet 144.  C-reactive protein 2.  May 25th 2021 AST 38 ALT 47 alk phos 110 bilirubin 0.8.  Cholesterol 229 triglycerides 278 HDL 35 .  A1c was 6.8.  March 5, 2021 labs show quant to Farren test was negative.  January 28, 2021 total bilirubin 0.6 alkaline phosphatase 124 AST 31 ALT 43.  A1c was 8.2  October 21, 2020 total bili 0.8 alk phos 111 AST 30 ALT 31.  A1c was 7.6  Elderberry in late summer and so that may have explained the flare that he did.

## 2023-01-18 ENCOUNTER — LAB OUTSIDE AN ENCOUNTER (OUTPATIENT)
Dept: URBAN - METROPOLITAN AREA TELEHEALTH 2 | Facility: TELEHEALTH | Age: 63
End: 2023-01-18

## 2023-02-24 ENCOUNTER — LAB OUTSIDE AN ENCOUNTER (OUTPATIENT)
Dept: URBAN - METROPOLITAN AREA CLINIC 86 | Facility: CLINIC | Age: 63
End: 2023-02-24